# Patient Record
Sex: MALE | URBAN - METROPOLITAN AREA
[De-identification: names, ages, dates, MRNs, and addresses within clinical notes are randomized per-mention and may not be internally consistent; named-entity substitution may affect disease eponyms.]

---

## 2019-01-15 ENCOUNTER — APPOINTMENT (OUTPATIENT)
Age: 74
Setting detail: DERMATOLOGY
End: 2019-01-15

## 2019-01-15 DIAGNOSIS — S69.92X: ICD-10-CM

## 2019-01-15 PROBLEM — M12.9 ARTHROPATHY, UNSPECIFIED: Status: ACTIVE | Noted: 2019-01-15

## 2019-01-15 PROBLEM — Z85.828 PERSONAL HISTORY OF OTHER MALIGNANT NEOPLASM OF SKIN: Status: ACTIVE | Noted: 2019-01-15

## 2019-01-15 PROBLEM — S69.92XA UNSPECIFIED INJURY OF LEFT WRIST, HAND AND FINGER(S), INITIAL ENCOUNTER: Status: ACTIVE | Noted: 2019-01-15

## 2019-01-15 PROCEDURE — ? ADDITIONAL NOTES

## 2019-01-15 PROCEDURE — 99202 OFFICE O/P NEW SF 15 MIN: CPT

## 2019-01-15 ASSESSMENT — LOCATION ZONE DERM
LOCATION ZONE: FINGER
LOCATION ZONE: FINGER

## 2019-01-15 ASSESSMENT — LOCATION SIMPLE DESCRIPTION DERM
LOCATION SIMPLE: LEFT THUMB
LOCATION SIMPLE: LEFT THUMB

## 2019-01-15 ASSESSMENT — LOCATION DETAILED DESCRIPTION DERM
LOCATION DETAILED: LEFT DISTAL ULNAR THUMB
LOCATION DETAILED: LEFT DISTAL ULNAR THUMB

## 2019-01-15 NOTE — HPI: WOUND
Is This A New Presentation, Or A Follow-Up?: Wound
Is The Wound New, Recurrent, Or Chronic?: new
How Severe Is It?: moderate
How Is Your Wound Healing?: not healing
Date Of Injury: 1.04.2019

## 2021-07-21 ENCOUNTER — NEW PATIENT (OUTPATIENT)
Age: 76
End: 2021-07-21

## 2021-07-21 DIAGNOSIS — H35.30: ICD-10-CM

## 2021-07-21 DIAGNOSIS — H35.373: ICD-10-CM

## 2021-07-21 DIAGNOSIS — H25.813: ICD-10-CM

## 2021-07-21 PROCEDURE — 92004 COMPRE OPH EXAM NEW PT 1/>: CPT

## 2021-07-21 PROCEDURE — 92134 CPTRZ OPH DX IMG PST SGM RTA: CPT

## 2021-07-21 PROCEDURE — 92015 DETERMINE REFRACTIVE STATE: CPT | Mod: GY

## 2021-07-21 ASSESSMENT — KERATOMETRY
OS_AXISANGLE_DEGREES: 87
OS_K2POWER_DIOPTERS: 47.00
OS_K1POWER_DIOPTERS: 45.75
OD_K1POWER_DIOPTERS: 42.00
OD_K2POWER_DIOPTERS: 46.25
OS_AXISANGLE2_DEGREES: 177
OD_AXISANGLE_DEGREES: 131
OD_AXISANGLE2_DEGREES: 41

## 2021-07-21 ASSESSMENT — TONOMETRY
OS_IOP_MMHG: 15
OD_IOP_MMHG: 21

## 2021-07-21 ASSESSMENT — VISUAL ACUITY
OD_CC: 20/60+2
OS_CC: 20/40-2